# Patient Record
Sex: MALE | Race: BLACK OR AFRICAN AMERICAN | NOT HISPANIC OR LATINO | Employment: FULL TIME | URBAN - METROPOLITAN AREA
[De-identification: names, ages, dates, MRNs, and addresses within clinical notes are randomized per-mention and may not be internally consistent; named-entity substitution may affect disease eponyms.]

---

## 2024-11-10 ENCOUNTER — HOSPITAL ENCOUNTER (EMERGENCY)
Facility: HOSPITAL | Age: 49
Discharge: HOME | End: 2024-11-10
Attending: EMERGENCY MEDICINE

## 2024-11-10 VITALS
OXYGEN SATURATION: 96 % | TEMPERATURE: 97 F | WEIGHT: 305 LBS | DIASTOLIC BLOOD PRESSURE: 74 MMHG | HEART RATE: 84 BPM | BODY MASS INDEX: 41.31 KG/M2 | RESPIRATION RATE: 18 BRPM | SYSTOLIC BLOOD PRESSURE: 144 MMHG | HEIGHT: 72 IN

## 2024-11-10 DIAGNOSIS — S16.1XXA STRAIN OF NECK MUSCLE, INITIAL ENCOUNTER: ICD-10-CM

## 2024-11-10 DIAGNOSIS — S39.012A BACK STRAIN, INITIAL ENCOUNTER: ICD-10-CM
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
## 2024-11-10 PROCEDURE — 25000003 PHARM REV CODE 250: Performed by: EMERGENCY MEDICINE

## 2024-11-10 PROCEDURE — 99284 EMERGENCY DEPT VISIT MOD MDM: CPT | Mod: 25

## 2024-11-10 RX ORDER — NAPROXEN 500 MG/1
500 TABLET ORAL 2 TIMES DAILY WITH MEALS
Qty: 15 TABLET | Refills: 0 | Status: SHIPPED | OUTPATIENT
Start: 2024-11-10

## 2024-11-10 RX ORDER — METHOCARBAMOL 500 MG/1
TABLET, FILM COATED ORAL
Qty: 20 TABLET | Refills: 0 | Status: SHIPPED | OUTPATIENT
Start: 2024-11-10

## 2024-11-10 RX ORDER — NAPROXEN 500 MG/1
500 TABLET ORAL
Status: COMPLETED | OUTPATIENT
Start: 2024-11-10 | End: 2024-11-10

## 2024-11-10 RX ORDER — METHOCARBAMOL 500 MG/1
500 TABLET, FILM COATED ORAL
Status: COMPLETED | OUTPATIENT
Start: 2024-11-10 | End: 2024-11-10

## 2024-11-10 RX ADMIN — NAPROXEN 500 MG: 500 TABLET ORAL at 10:11

## 2024-11-10 RX ADMIN — METHOCARBAMOL 500 MG: 500 TABLET ORAL at 10:11

## 2024-11-11 ENCOUNTER — HOSPITAL ENCOUNTER (EMERGENCY)
Facility: HOSPITAL | Age: 49
Discharge: HOME | End: 2024-11-11
Attending: EMERGENCY MEDICINE

## 2024-11-11 VITALS
OXYGEN SATURATION: 95 % | HEIGHT: 72 IN | RESPIRATION RATE: 18 BRPM | BODY MASS INDEX: 41.31 KG/M2 | DIASTOLIC BLOOD PRESSURE: 96 MMHG | WEIGHT: 305 LBS | HEART RATE: 99 BPM | TEMPERATURE: 99 F | SYSTOLIC BLOOD PRESSURE: 157 MMHG

## 2024-11-11 DIAGNOSIS — S16.1XXA STRAIN OF NECK MUSCLE, INITIAL ENCOUNTER: ICD-10-CM

## 2024-11-11 DIAGNOSIS — S13.4XXA WHIPLASH INJURY TO NECK, INITIAL ENCOUNTER: Primary | ICD-10-CM

## 2024-11-11 PROCEDURE — 99284 EMERGENCY DEPT VISIT MOD MDM: CPT | Mod: 25

## 2024-11-11 RX ORDER — LIDOCAINE 50 MG/G
1 PATCH TOPICAL DAILY
Qty: 7 PATCH | Refills: 0 | Status: SHIPPED | OUTPATIENT
Start: 2024-11-11 | End: 2024-11-18

## 2024-11-11 NOTE — Clinical Note
"Fredrick Greene" Awais was seen and treated in our emergency department on 11/11/2024.  He may return to work on 11/12/2024.       If you have any questions or concerns, please don't hesitate to call.      Mela Choudhary PA-C"

## 2024-11-11 NOTE — ED PROVIDER NOTES
"Received a phone call from the reading radiologist on this patient's cervical spine plain film.    Per reading radiologist:     "Incompletely visualized cervical spine, demonstrating a mildly reversed lordosis and degenerative disc disease.     Interspinous and interlaminar widening at C4-5, favored to be secondary to acquired ligamentous laxity.  However, in the appropriate clinical context (such as if there were corresponding acute pain or point tenderness), an acute ligamentous injury might then also be a consideration.  This requires clinical correlation.  If there are corresponding clinical abnormalities, cross-sectional imaging would be recommended.     This report was flagged in Epic as abnormal."    I did not personally see or examine this patient (Dr. Fernanda Marie was the physician who examined the patient and physically examined the patient). However, in light of the aforementioned findings on plain radiograph with no true clinical context, I think it would be in the patient's best interest to return to the ED to be once again formally evaluated to determine if advanced imaging of the cervical spine (CT, MRI) would be needed.    I contacted the patient any stated he would try to come back to this ER for evaluation after 7:00 a.m. today (11/11).  He verbalized understanding of the abnormality of the a plain radiograph as I described in layman's terms.        Javier Silverio MD, [facility]   Emergency Medicine         Javier Silverio MD  11/11/24 2227    "
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
## 2024-11-11 NOTE — DISCHARGE INSTRUCTIONS
You were seen here today for neck pain. I believe this is most likely due to a muscle strain/sprain.  This can happen with overuse or pulling the muscle.  Put ice or a cold pack on the sore area for 10 to 20 minutes at a time. Try to do this every 1 to 2 hours for the next 3 days (when you are awake). Put a thin cloth between the ice and your skin. After 2 or 3 days, you can try applying heat to the area that hurts. Apply heat for 10 to 20 minutes at a time, several times a day. You might also try switching between ice and heat.  Over the next few days while you were resting, be sure to elevate the area, ideally above the heart, to reduce swelling.    I recommend over the counter Tylenol (Acetaminophen) and/or Motrin (Ibuprofen) for additional control of pain. You can stagger the dosing so you are taking one or the other every three hours while spacing out the Tylenol and every 6 hours and the Motrin every 6 hours. HOWEVER, if you are taking another NSAID such as Ibuprofen, Meloxicam, Toradol, Celebrex, or others, DO NOT take Ibuprofen at the same time.     See discharge paperwork for more information on your diagnosis and stretches/exercises you can do to alleviate the pain. Do not do anything that makes the pain worse. Return to exercise gradually as you feel better.
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
